# Patient Record
Sex: FEMALE | Race: WHITE | NOT HISPANIC OR LATINO | Employment: FULL TIME | URBAN - METROPOLITAN AREA
[De-identification: names, ages, dates, MRNs, and addresses within clinical notes are randomized per-mention and may not be internally consistent; named-entity substitution may affect disease eponyms.]

---

## 2019-03-20 DIAGNOSIS — M79.671 RIGHT FOOT PAIN: Primary | ICD-10-CM

## 2019-03-21 ENCOUNTER — OFFICE VISIT (OUTPATIENT)
Dept: ORTHOPEDICS | Facility: CLINIC | Age: 49
End: 2019-03-21
Payer: COMMERCIAL

## 2019-03-21 ENCOUNTER — HOSPITAL ENCOUNTER (OUTPATIENT)
Dept: RADIOLOGY | Facility: HOSPITAL | Age: 49
Discharge: HOME | End: 2019-03-21
Attending: ORTHOPAEDIC SURGERY
Payer: COMMERCIAL

## 2019-03-21 VITALS
DIASTOLIC BLOOD PRESSURE: 81 MMHG | WEIGHT: 168 LBS | BODY MASS INDEX: 28.68 KG/M2 | HEIGHT: 64 IN | HEART RATE: 67 BPM | SYSTOLIC BLOOD PRESSURE: 141 MMHG

## 2019-03-21 DIAGNOSIS — M79.671 RIGHT FOOT PAIN: ICD-10-CM

## 2019-03-21 DIAGNOSIS — M79.672 BILATERAL FOOT PAIN: ICD-10-CM

## 2019-03-21 DIAGNOSIS — M76.61 TENDONITIS, ACHILLES, RIGHT: ICD-10-CM

## 2019-03-21 DIAGNOSIS — M79.671 BILATERAL FOOT PAIN: ICD-10-CM

## 2019-03-21 PROCEDURE — 3008F BODY MASS INDEX DOCD: CPT | Mod: CPTII,S$GLB,, | Performed by: ORTHOPAEDIC SURGERY

## 2019-03-21 PROCEDURE — 99204 PR OFFICE/OUTPT VISIT, NEW, LEVL IV, 45-59 MIN: ICD-10-PCS | Mod: S$GLB,,, | Performed by: ORTHOPAEDIC SURGERY

## 2019-03-21 PROCEDURE — 3008F PR BODY MASS INDEX (BMI) DOCUMENTED: ICD-10-PCS | Mod: CPTII,S$GLB,, | Performed by: ORTHOPAEDIC SURGERY

## 2019-03-21 PROCEDURE — 99999 PR PBB SHADOW E&M-EST. PATIENT-LVL III: CPT | Mod: PBBFAC,,, | Performed by: ORTHOPAEDIC SURGERY

## 2019-03-21 PROCEDURE — 99999 PR PBB SHADOW E&M-EST. PATIENT-LVL III: ICD-10-PCS | Mod: PBBFAC,,, | Performed by: ORTHOPAEDIC SURGERY

## 2019-03-21 PROCEDURE — 99204 OFFICE O/P NEW MOD 45 MIN: CPT | Mod: S$GLB,,, | Performed by: ORTHOPAEDIC SURGERY

## 2019-03-21 PROCEDURE — 73630 X-RAY EXAM OF FOOT: CPT | Mod: 50,TC,PO

## 2019-03-21 PROCEDURE — 73630 XR FOOT COMPLETE 3 VIEW BILATERAL: ICD-10-PCS | Mod: 26,50,, | Performed by: RADIOLOGY

## 2019-03-21 PROCEDURE — 73630 X-RAY EXAM OF FOOT: CPT | Mod: 26,50,, | Performed by: RADIOLOGY

## 2019-03-27 PROBLEM — M76.61 TENDONITIS, ACHILLES, RIGHT: Status: ACTIVE | Noted: 2019-03-27

## 2019-03-27 NOTE — PROGRESS NOTES
"HPI: Roz العلي is a  48 y.o. female who complains of bilateral foot pain. She rates her pain as 4/10 today. The right side is heel pain and the left side she injured several years ago but it never felt any better.  The right is more painful than the left for about 6 months to a year.  No history of injury or trauma. Pt denies weakness, numbness, and tingling.       PAST MEDICAL/SURGICAL/FAMILY/SOCIAL/ HISTORY: REVIEWED    ALLERGIES/MEDICATIONS: REVIEWED       Review of Systems:     Constitution: Negative.   HEENT: Negative.   Eyes: Negative.   Cardiovascular: Negative.   Respiratory: Negative.   Endocrine: Negative.   Hematologic/Lymphatic: Negative.   Skin: Negative.   Musculoskeletal:  See HPI   Gastrointestinal: Negative.   Genitourinary: Negative.   Neurological: Negative.   Psychiatric/Behavioral: Negative.   Allergic/Immunologic: Negative.       PHYSICAL EXAM:  Vitals:    03/21/19 1050   BP: (!) 141/81   Pulse: 67     Ht Readings from Last 1 Encounters:   03/21/19 5' 4" (1.626 m)     Wt Readings from Last 1 Encounters:   03/21/19 76.2 kg (167 lb 15.9 oz)       GENERAL: Well developed, well nourished, no acute distress.  SKIN: Skin is intact. No atrophy, abrasions or lesions are noted.   Neurological: Normal mental status. Appropriate and conversant. Alert and oriented x 3.  GAIT: Walks with non-antalgic gait.    Right lower extremity compared with LLE:  2+ dorsalis pedis pulse.  Capillary refill < 3 seconds.  Normal range of motion tibiotalar and subtalar joints. Normal alignment of the forefoot and the hindfoot.  5/5 strength EHL, FHL, tibialis anterior, gastrocsoleus, tibialis posterior and peroneals. Sensation to light touch intact sural, saphenous, superficial peroneal and deep peroneal nerves. + swelling and  tenderness to palpation at the insertion of the achilles tendon, ecchymosis or deformity. No lymphadenopathy, no masses or tumors palpated.      XRAYS:   3 views of  obtained and reviewed " today reveal spurring at the insertion of the achilles and loyda's deformity on the right.       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 More than half the time was spent counseling the patient on her condition and the options for operative versus non-operative care.   I recommend surgical treatment with loyda's excision, achilles debridement/re-insertion, and possible gastroc recession. She will f/u when ready for surgery.

## 2021-05-10 ENCOUNTER — PATIENT MESSAGE (OUTPATIENT)
Dept: RESEARCH | Facility: HOSPITAL | Age: 51
End: 2021-05-10

## 2021-10-18 ENCOUNTER — PATIENT MESSAGE (OUTPATIENT)
Dept: GASTROENTEROLOGY | Facility: CLINIC | Age: 51
End: 2021-10-18
Payer: COMMERCIAL

## 2021-10-18 ENCOUNTER — PATIENT MESSAGE (OUTPATIENT)
Dept: GASTROENTEROLOGY | Facility: CLINIC | Age: 51
End: 2021-10-18

## 2021-10-18 ENCOUNTER — TELEPHONE (OUTPATIENT)
Dept: GASTROENTEROLOGY | Facility: CLINIC | Age: 51
End: 2021-10-18